# Patient Record
Sex: FEMALE | Race: WHITE | NOT HISPANIC OR LATINO | Employment: FULL TIME | URBAN - METROPOLITAN AREA
[De-identification: names, ages, dates, MRNs, and addresses within clinical notes are randomized per-mention and may not be internally consistent; named-entity substitution may affect disease eponyms.]

---

## 2018-09-06 LAB — HBA1C MFR BLD HPLC: 7.2 %

## 2018-09-07 LAB — HBA1C MFR BLD HPLC: 7.2 %

## 2018-09-14 ENCOUNTER — OFFICE VISIT (OUTPATIENT)
Dept: INTERNAL MEDICINE CLINIC | Facility: CLINIC | Age: 58
End: 2018-09-14
Payer: OTHER MISCELLANEOUS

## 2018-09-14 VITALS
RESPIRATION RATE: 18 BRPM | SYSTOLIC BLOOD PRESSURE: 128 MMHG | HEART RATE: 79 BPM | TEMPERATURE: 97.3 F | DIASTOLIC BLOOD PRESSURE: 62 MMHG | OXYGEN SATURATION: 98 %

## 2018-09-14 DIAGNOSIS — F07.81 POST CONCUSSION SYNDROME: ICD-10-CM

## 2018-09-14 DIAGNOSIS — R55 SYNCOPE, UNSPECIFIED SYNCOPE TYPE: Primary | ICD-10-CM

## 2018-09-14 DIAGNOSIS — M50.30 DDD (DEGENERATIVE DISC DISEASE), CERVICAL: ICD-10-CM

## 2018-09-14 DIAGNOSIS — R20.2 TINGLING OF FACE: ICD-10-CM

## 2018-09-14 DIAGNOSIS — M26.622 TMJ TENDERNESS, LEFT: ICD-10-CM

## 2018-09-14 PROCEDURE — 99204 OFFICE O/P NEW MOD 45 MIN: CPT | Performed by: INTERNAL MEDICINE

## 2018-09-14 RX ORDER — CRANBERRY FRUIT EXTRACT 200 MG
CAPSULE ORAL
COMMUNITY
Start: 2014-05-01

## 2018-09-14 RX ORDER — CHLORAL HYDRATE 500 MG
CAPSULE ORAL
COMMUNITY

## 2018-09-14 RX ORDER — OMEPRAZOLE 40 MG/1
40 CAPSULE, DELAYED RELEASE ORAL 2 TIMES DAILY
Refills: 5 | COMMUNITY
Start: 2018-08-23

## 2018-09-14 RX ORDER — LISINOPRIL AND HYDROCHLOROTHIAZIDE 20; 12.5 MG/1; MG/1
1 TABLET ORAL EVERY 12 HOURS
Refills: 5 | COMMUNITY
Start: 2018-08-28

## 2018-09-14 RX ORDER — LOPERAMIDE HYDROCHLORIDE 2 MG/1
TABLET ORAL
COMMUNITY

## 2018-09-14 RX ORDER — ONDANSETRON HYDROCHLORIDE 8 MG/1
TABLET, FILM COATED ORAL
Refills: 5 | COMMUNITY
Start: 2018-08-08

## 2018-09-14 RX ORDER — DICYCLOMINE HCL 20 MG
TABLET ORAL
Refills: 5 | COMMUNITY
Start: 2018-08-16

## 2018-09-14 RX ORDER — GABAPENTIN 300 MG/1
300 CAPSULE ORAL DAILY
COMMUNITY

## 2018-09-14 RX ORDER — ACETAMINOPHEN 325 MG/1
TABLET ORAL
COMMUNITY

## 2018-09-14 RX ORDER — LINAGLIPTIN 5 MG/1
5 TABLET, FILM COATED ORAL DAILY
Refills: 5 | COMMUNITY
Start: 2018-08-23

## 2018-09-14 RX ORDER — ROSUVASTATIN CALCIUM 5 MG/1
TABLET, COATED ORAL
Refills: 0 | COMMUNITY
Start: 2018-09-07

## 2018-09-14 RX ORDER — MULTIVIT WITH MINERALS/LUTEIN
TABLET ORAL
COMMUNITY
Start: 2014-05-01

## 2018-09-14 RX ORDER — TRAMADOL HYDROCHLORIDE 50 MG/1
TABLET ORAL
Refills: 3 | COMMUNITY
Start: 2018-08-28

## 2018-09-14 RX ORDER — MONTELUKAST SODIUM 4 MG/1
TABLET, CHEWABLE ORAL
COMMUNITY
Start: 2014-03-17

## 2018-09-14 RX ORDER — PROCHLORPERAZINE MALEATE 10 MG
TABLET ORAL
COMMUNITY
End: 2018-10-30

## 2018-09-14 RX ORDER — OXYCODONE HYDROCHLORIDE AND ACETAMINOPHEN 5; 325 MG/1; MG/1
TABLET ORAL
COMMUNITY
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
## 2018-09-14 NOTE — LETTER
To whom it may concern,    The above pt was seen for a work related injury  Unfortunately she has a complex coarse and all of her records are unable at this time  Some of the hospital records and all of her recent OP appt notes with Cards, Neuro, and her PCP are not available  Pt reports already being back to work several days and is having no issues, but continues with left facial signs and symptoms  From a Musculoskeletal stand point and her current job description, she appears to be able to continue her job  From a cardiac and neurological stand point, the w/u still appears to be in discovery and I can not clear her from this stand point  She will best be cleared from this aspect of her care by her respective specialist  Please call with any questions  Thank you      Sincerely,      YANETH Self, DO

## 2018-10-30 ENCOUNTER — APPOINTMENT (OUTPATIENT)
Dept: RADIOLOGY | Facility: CLINIC | Age: 58
End: 2018-10-30
Payer: OTHER MISCELLANEOUS

## 2018-10-30 ENCOUNTER — TRANSCRIBE ORDERS (OUTPATIENT)
Dept: RADIOLOGY | Facility: CLINIC | Age: 58
End: 2018-10-30

## 2018-10-30 ENCOUNTER — OFFICE VISIT (OUTPATIENT)
Dept: INTERNAL MEDICINE CLINIC | Facility: CLINIC | Age: 58
End: 2018-10-30
Payer: OTHER MISCELLANEOUS

## 2018-10-30 VITALS
WEIGHT: 206 LBS | HEART RATE: 90 BPM | RESPIRATION RATE: 18 BRPM | BODY MASS INDEX: 36.5 KG/M2 | TEMPERATURE: 97.8 F | HEIGHT: 63 IN | DIASTOLIC BLOOD PRESSURE: 68 MMHG | SYSTOLIC BLOOD PRESSURE: 130 MMHG
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
## 2018-10-30 DIAGNOSIS — T07.XXXA MULTIPLE CONTUSIONS: ICD-10-CM

## 2018-10-30 DIAGNOSIS — G44.319 ACUTE POST-TRAUMATIC HEADACHE, NOT INTRACTABLE: ICD-10-CM

## 2018-10-30 DIAGNOSIS — S09.93XA FACIAL INJURY, INITIAL ENCOUNTER: ICD-10-CM
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
## 2018-10-30 DIAGNOSIS — M54.2 CERVICALGIA: ICD-10-CM

## 2018-10-30 DIAGNOSIS — M54.6 THORACIC SPINE PAIN: ICD-10-CM

## 2018-10-30 PROBLEM — A04.72 CLOSTRIDIUM DIFFICILE DIARRHEA: Status: ACTIVE | Noted: 2018-04-23

## 2018-10-30 PROBLEM — H26.9 CATARACTS, BOTH EYES: Status: ACTIVE | Noted: 2018-10-30

## 2018-10-30 PROCEDURE — 72040 X-RAY EXAM NECK SPINE 2-3 VW: CPT

## 2018-10-30 PROCEDURE — 70150 X-RAY EXAM OF FACIAL BONES: CPT

## 2018-10-30 PROCEDURE — 70260 X-RAY EXAM OF SKULL: CPT

## 2018-10-30 PROCEDURE — 99214 OFFICE O/P EST MOD 30 MIN: CPT | Performed by: INTERNAL MEDICINE

## 2018-10-30 NOTE — LETTER
To whom it may concern,  The above was seen for a work related injury and is in the process of being evaluated  She is off work until re-evaled in one week and is to start PT  Expect RTW in one week if continues to progress  Call with any questions       Sincerely,     YANETH Self, DO
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
## 2018-10-31 ENCOUNTER — TELEPHONE (OUTPATIENT)
Dept: INTERNAL MEDICINE CLINIC | Facility: CLINIC | Age: 58
End: 2018-10-31

## 2018-10-31 NOTE — TELEPHONE ENCOUNTER
Pt said she needs a note for work that says  she will be off until her appt with you on 11-7-18  It needs to be faxed [phone] [facility]    Is this ok to send?

## 2018-10-31 NOTE — TELEPHONE ENCOUNTER
I faxed the letter along with a cover sheet stating that she will be off until her next appt 11-7-18  Carin Oliveros

## 2018-11-07 ENCOUNTER — OFFICE VISIT (OUTPATIENT)
Dept: INTERNAL MEDICINE CLINIC | Facility: CLINIC | Age: 58
End: 2018-11-07
Payer: OTHER MISCELLANEOUS

## 2018-11-07 VITALS
RESPIRATION RATE: 18 BRPM | BODY MASS INDEX: 37.56 KG/M2 | HEIGHT: 63 IN | SYSTOLIC BLOOD PRESSURE: 132 MMHG | WEIGHT: 212 LBS | HEART RATE: 86 BPM | TEMPERATURE: 97.8 F | DIASTOLIC BLOOD PRESSURE: 64 MMHG

## 2018-11-07 DIAGNOSIS — T07.XXXA MULTIPLE CONTUSIONS: ICD-10-CM

## 2018-11-07 DIAGNOSIS — M54.2 CERVICALGIA: ICD-10-CM

## 2018-11-07 DIAGNOSIS — M54.6 THORACIC SPINE PAIN: ICD-10-CM

## 2018-11-07 DIAGNOSIS — S09.93XD FACIAL INJURY, SUBSEQUENT ENCOUNTER: ICD-10-CM
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
## 2018-11-07 DIAGNOSIS — G44.319 ACUTE POST-TRAUMATIC HEADACHE, NOT INTRACTABLE: ICD-10-CM

## 2018-11-07 PROCEDURE — 99213 OFFICE O/P EST LOW 20 MIN: CPT | Performed by: INTERNAL MEDICINE
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
## 2018-11-26 ENCOUNTER — TELEPHONE (OUTPATIENT)
Dept: INTERNAL MEDICINE CLINIC | Facility: CLINIC | Age: 58
End: 2018-11-26

## 2018-11-26 DIAGNOSIS — M54.6 THORACIC SPINE PAIN: ICD-10-CM

## 2018-11-26 DIAGNOSIS — R20.2 TINGLING OF FACE: ICD-10-CM
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
## 2018-11-26 DIAGNOSIS — S09.93XD FACIAL INJURY, SUBSEQUENT ENCOUNTER: ICD-10-CM

## 2018-11-26 DIAGNOSIS — M26.622 TMJ TENDERNESS, LEFT: ICD-10-CM
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
## 2018-11-26 DIAGNOSIS — M54.2 CERVICALGIA: ICD-10-CM
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
## 2018-11-27 NOTE — TELEPHONE ENCOUNTER
I TRIED CALLING TWO TIMES-NO ANSWER-NOT ABLE TO LEAVE MESSAGE  I NEED TO KNOW WHERE SHE WANTS TO GO FOR SPORTS MEDICINE AND PHYSICAL THERAPY   (DR HULL IS HERE IN [place] [phone])

## 2018-11-27 NOTE — TELEPHONE ENCOUNTER
TRIED TO CALL AGAIN-NO ANSWER-NOT ABLE TO LM  I SENT BOTH REFERRALS OUT IN THE MAIL TO THE PATIENT W A NOTE-SHE NEEDS TO SCHEDULE THESE APPTS ASAP-I INCLUEDED DR HERCLES CARD FOR HER   Shantelle Ingram

## 2018-12-05 DIAGNOSIS — S09.93XA FACIAL INJURY, INITIAL ENCOUNTER: Primary | ICD-10-CM

## 2018-12-10 ENCOUNTER — OFFICE VISIT (OUTPATIENT)
Dept: INTERNAL MEDICINE CLINIC | Facility: CLINIC | Age: 58
End: 2018-12-10
Payer: OTHER MISCELLANEOUS

## 2018-12-10 VITALS
WEIGHT: 210 LBS | RESPIRATION RATE: 18 BRPM | HEART RATE: 90 BPM | BODY MASS INDEX: 37.21 KG/M2 | OXYGEN SATURATION: 98 % | HEIGHT: 63 IN | DIASTOLIC BLOOD PRESSURE: 62 MMHG | TEMPERATURE: 97.6 F | SYSTOLIC BLOOD PRESSURE: 128 MMHG

## 2018-12-10 DIAGNOSIS — M54.6 THORACIC SPINE PAIN: ICD-10-CM

## 2018-12-10 DIAGNOSIS — S09.93XD FACIAL INJURY, SUBSEQUENT ENCOUNTER: ICD-10-CM
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
## 2018-12-10 DIAGNOSIS — G50.0 TRIGEMINAL NEURALGIA: ICD-10-CM
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
## 2018-12-10 DIAGNOSIS — M26.622 TMJ TENDERNESS, LEFT: ICD-10-CM

## 2018-12-10 DIAGNOSIS — R20.2 TINGLING OF FACE: ICD-10-CM

## 2018-12-10 DIAGNOSIS — M54.2 CERVICALGIA: ICD-10-CM

## 2018-12-10 PROCEDURE — 99213 OFFICE O/P EST LOW 20 MIN: CPT | Performed by: INTERNAL MEDICINE

## 2018-12-10 RX ORDER — PROCHLORPERAZINE MALEATE 5 MG/1
TABLET ORAL
Refills: 3 | COMMUNITY
Start: 2018-12-06 | End: 2018-12-10
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
## 2018-12-17 ENCOUNTER — TELEPHONE (OUTPATIENT)
Dept: INTERNAL MEDICINE CLINIC | Facility: CLINIC | Age: 58
End: 2018-12-17
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
## 2018-12-26 ENCOUNTER — TELEPHONE (OUTPATIENT)
Dept: INTERNAL MEDICINE CLINIC | Facility: CLINIC | Age: 58
End: 2018-12-26

## 2018-12-26 NOTE — TELEPHONE ENCOUNTER
Pt called and reschedule the appointment, but had a question about getting medical alexandr and what the Doctors name is  ?    Please advise    Phone: [phone]
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
## 2022-01-10 ENCOUNTER — HOSPITAL ENCOUNTER (EMERGENCY)
Facility: HOSPITAL | Age: 62
Discharge: HOME | End: 2022-01-11
Attending: EMERGENCY MEDICINE | Admitting: EMERGENCY MEDICINE
Payer: COMMERCIAL

## 2022-01-10 ENCOUNTER — APPOINTMENT (EMERGENCY)
Dept: CT IMAGING | Facility: HOSPITAL | Age: 62
End: 2022-01-10
Payer: COMMERCIAL

## 2022-01-10 DIAGNOSIS — I10 HYPERTENSION: ICD-10-CM

## 2022-01-10 DIAGNOSIS — R11.2 NAUSEA & VOMITING: Primary | ICD-10-CM

## 2022-01-10 DIAGNOSIS — R51.9 HEADACHE: ICD-10-CM

## 2022-01-10 LAB
ALBUMIN SERPL BCP-MCNC: 3.6 G/DL (ref 3.5–5)
ALP SERPL-CCNC: 117 U/L (ref 46–116)
ALT SERPL W P-5'-P-CCNC: 24 U/L (ref 12–78)
ANION GAP SERPL CALCULATED.3IONS-SCNC: 9 MMOL/L (ref 4–13)
AST SERPL W P-5'-P-CCNC: 14 U/L (ref 5–45)
BASOPHILS # BLD AUTO: 0.07 THOUSANDS/ΜL (ref 0–0.1)
BASOPHILS NFR BLD AUTO: 0 % (ref 0–1)
BILIRUB SERPL-MCNC: 0.4 MG/DL (ref 0.2–1)
BUN SERPL-MCNC: 14 MG/DL (ref 5–25)
CALCIUM SERPL-MCNC: 9.1 MG/DL (ref 8.3–10.1)
CHLORIDE SERPL-SCNC: 102 MMOL/L (ref 100–108)
CO2 SERPL-SCNC: 27 MMOL/L (ref 21–32)
CREAT SERPL-MCNC: 0.86 MG/DL (ref 0.6–1.3)
EOSINOPHIL # BLD AUTO: 0.11 THOUSAND/ΜL (ref 0–0.61)
EOSINOPHIL NFR BLD AUTO: 1 % (ref 0–6)
ERYTHROCYTE [DISTWIDTH] IN BLOOD BY AUTOMATED COUNT: 13.6 % (ref 11.6–15.1)
GFR SERPL CREATININE-BSD FRML MDRD: 72 ML/MIN/1.73SQ M
GLUCOSE SERPL-MCNC: 154 MG/DL (ref 65–140)
HCT VFR BLD AUTO: 39.1 % (ref 34.8–46.1)
HGB BLD-MCNC: 12.7 G/DL (ref 11.5–15.4)
HOLD SPECIMEN: NORMAL
IMM GRANULOCYTES # BLD AUTO: 0.14 THOUSAND/UL (ref 0–0.2)
IMM GRANULOCYTES NFR BLD AUTO: 1 % (ref 0–2)
LIPASE SERPL-CCNC: 61 U/L (ref 73–393)
LYMPHOCYTES # BLD AUTO: 2.14 THOUSANDS/ΜL (ref 0.6–4.47)
LYMPHOCYTES NFR BLD AUTO: 12 % (ref 14–44)
MCH RBC QN AUTO: 28.5 PG (ref 26.8–34.3)
MCHC RBC AUTO-ENTMCNC: 32.5 G/DL (ref 31.4–37.4)
MCV RBC AUTO: 88 FL (ref 82–98)
MONOCYTES # BLD AUTO: 0.72 THOUSAND/ΜL (ref 0.17–1.22)
MONOCYTES NFR BLD AUTO: 4 % (ref 4–12)
NEUTROPHILS # BLD AUTO: 15.12 THOUSANDS/ΜL (ref 1.85–7.62)
NEUTS SEG NFR BLD AUTO: 82 % (ref 43–75)
NRBC BLD AUTO-RTO: 0 /100 WBCS
PLATELET # BLD AUTO: 310 THOUSANDS/UL (ref 149–390)
PMV BLD AUTO: 12.2 FL (ref 8.9–12.7)
POTASSIUM SERPL-SCNC: 3.7 MMOL/L (ref 3.5–5.3)
PROT SERPL-MCNC: 8.1 G/DL (ref 6.4–8.2)
RBC # BLD AUTO: 4.45 MILLION/UL (ref 3.81–5.12)
SODIUM SERPL-SCNC: 138 MMOL/L (ref 136–145)
WBC # BLD AUTO: 18.3 THOUSAND/UL (ref 4.31–10.16)

## 2022-01-10 PROCEDURE — 36415 COLL VENOUS BLD VENIPUNCTURE: CPT

## 2022-01-10 PROCEDURE — 96375 TX/PRO/DX INJ NEW DRUG ADDON: CPT

## 2022-01-10 PROCEDURE — 96374 THER/PROPH/DIAG INJ IV PUSH: CPT

## 2022-01-10 PROCEDURE — 80053 COMPREHEN METABOLIC PANEL: CPT | Performed by: EMERGENCY MEDICINE

## 2022-01-10 PROCEDURE — 85025 COMPLETE CBC W/AUTO DIFF WBC: CPT | Performed by: EMERGENCY MEDICINE

## 2022-01-10 PROCEDURE — 99284 EMERGENCY DEPT VISIT MOD MDM: CPT

## 2022-01-10 PROCEDURE — 83690 ASSAY OF LIPASE: CPT | Performed by: EMERGENCY MEDICINE

## 2022-01-10 PROCEDURE — 96361 HYDRATE IV INFUSION ADD-ON: CPT

## 2022-01-10 PROCEDURE — 99285 EMERGENCY DEPT VISIT HI MDM: CPT | Performed by: EMERGENCY MEDICINE

## 2022-01-10 PROCEDURE — 93005 ELECTROCARDIOGRAM TRACING: CPT

## 2022-01-10 RX ORDER — MORPHINE SULFATE 10 MG/ML
6 INJECTION, SOLUTION INTRAMUSCULAR; INTRAVENOUS ONCE
Status: COMPLETED | OUTPATIENT
Start: 2022-01-10 | End: 2022-01-10

## 2022-01-10 RX ORDER — ONDANSETRON 2 MG/ML
4 INJECTION INTRAMUSCULAR; INTRAVENOUS ONCE
Status: COMPLETED | OUTPATIENT
Start: 2022-01-10 | End: 2022-01-10

## 2022-01-10 RX ADMIN — SODIUM CHLORIDE 1000 ML: 0.9 INJECTION, SOLUTION INTRAVENOUS at 22:27

## 2022-01-10 RX ADMIN — MORPHINE SULFATE 6 MG: 10 INJECTION INTRAVENOUS at 22:27

## 2022-01-10 RX ADMIN — ONDANSETRON 4 MG: 2 INJECTION INTRAMUSCULAR; INTRAVENOUS at 21:15

## 2022-01-11 ENCOUNTER — APPOINTMENT (EMERGENCY)
Dept: CT IMAGING | Facility: HOSPITAL | Age: 62
End: 2022-01-11
Payer: COMMERCIAL

## 2022-01-11 VITALS
TEMPERATURE: 98.5 F | OXYGEN SATURATION: 94 % | HEART RATE: 60 BPM | RESPIRATION RATE: 18 BRPM | SYSTOLIC BLOOD PRESSURE: 189 MMHG | DIASTOLIC BLOOD PRESSURE: 76 MMHG

## 2022-01-11 PROCEDURE — 70450 CT HEAD/BRAIN W/O DYE: CPT

## 2022-01-11 PROCEDURE — G1004 CDSM NDSC: HCPCS

## 2022-01-11 PROCEDURE — 96376 TX/PRO/DX INJ SAME DRUG ADON: CPT

## 2022-01-11 PROCEDURE — 96372 THER/PROPH/DIAG INJ SC/IM: CPT

## 2022-01-11 PROCEDURE — 96375 TX/PRO/DX INJ NEW DRUG ADDON: CPT

## 2022-01-11 PROCEDURE — 74177 CT ABD & PELVIS W/CONTRAST: CPT

## 2022-01-11 PROCEDURE — 96361 HYDRATE IV INFUSION ADD-ON: CPT

## 2022-01-11 RX ORDER — ONDANSETRON 4 MG/1
4 TABLET, ORALLY DISINTEGRATING ORAL EVERY 6 HOURS PRN
Qty: 20 TABLET | Refills: 0 | Status: SHIPPED | OUTPATIENT
Start: 2022-01-11

## 2022-01-11 RX ORDER — HALOPERIDOL 5 MG/ML
5 INJECTION INTRAMUSCULAR ONCE
Status: COMPLETED | OUTPATIENT
Start: 2022-01-11 | End: 2022-01-11

## 2022-01-11 RX ORDER — ONDANSETRON 2 MG/ML
4 INJECTION INTRAMUSCULAR; INTRAVENOUS ONCE
Status: COMPLETED | OUTPATIENT
Start: 2022-01-11 | End: 2022-01-11

## 2022-01-11 RX ORDER — ONDANSETRON 2 MG/ML
INJECTION INTRAMUSCULAR; INTRAVENOUS
Status: DISCONTINUED
Start: 2022-01-11 | End: 2022-01-11 | Stop reason: WASHOUT

## 2022-01-11 RX ORDER — BUTALBITAL, ACETAMINOPHEN AND CAFFEINE 50; 325; 40 MG/1; MG/1; MG/1
1 TABLET ORAL ONCE
Status: COMPLETED | OUTPATIENT
Start: 2022-01-11 | End: 2022-01-11

## 2022-01-11 RX ORDER — KETOROLAC TROMETHAMINE 30 MG/ML
15 INJECTION, SOLUTION INTRAMUSCULAR; INTRAVENOUS ONCE
Status: COMPLETED | OUTPATIENT
Start: 2022-01-11 | End: 2022-01-11

## 2022-01-11 RX ORDER — ACETAMINOPHEN 325 MG/1
650 TABLET ORAL ONCE
Status: COMPLETED | OUTPATIENT
Start: 2022-01-11 | End: 2022-01-11

## 2022-01-11 RX ADMIN — KETOROLAC TROMETHAMINE 15 MG: 30 INJECTION, SOLUTION INTRAMUSCULAR; INTRAVENOUS at 05:33

## 2022-01-11 RX ADMIN — ACETAMINOPHEN 650 MG: 325 TABLET, FILM COATED ORAL at 05:33

## 2022-01-11 RX ADMIN — IOHEXOL 100 ML: 350 INJECTION, SOLUTION INTRAVENOUS at 00:14

## 2022-01-11 RX ADMIN — HALOPERIDOL LACTATE 5 MG: 5 INJECTION, SOLUTION INTRAMUSCULAR at 03:22

## 2022-01-11 RX ADMIN — ONDANSETRON HYDROCHLORIDE 4 MG: 2 INJECTION, SOLUTION INTRAVENOUS at 00:10

## 2022-01-11 RX ADMIN — BUTALBITAL, ACETAMINOPHEN AND CAFFEINE 1 TABLET: 50; 325; 40 TABLET ORAL at 05:33

## 2022-01-11 NOTE — ED PROVIDER NOTES
History  Chief Complaint   Patient presents with    Vomiting     patient c/o vomiting and nausea that she always has that got worse today  states she is "chronically ill" for a long time  This is a 69-year-old female with history of Crohn's disease on stelara, diabetes, GERD, hypertension who presents with abdominal pain  Patient states that she has chronic abdominal pain  However, starting today, she has been experiencing innumerable episodes of nonbloody, nonmelanotic diarrhea and nonbloody vomiting  Denies any sick contacts  Patient has also been experiencing 3 weeks of worsening headaches  She has been seen at [facility] for the headaches  Patient follows with KRISTIE Carr for many of her medical issues  She has also noted several weeks of elevated blood pressure  Patient states that her family doctor added Norvasc to her regimen  Denies fever/chills, lightheadedness/dizziness, numbness/weakness, headache, change in vision, URI symptoms, neck pain, chest pain, palpitations, shortness of breath, cough, back pain, flank pain, hematochezia, melena, dysuria, hematuria, abnormal vaginal discharge/bleeding  Prior to Admission Medications   Prescriptions Last Dose Informant Patient Reported? Taking?    Ascorbic Acid (VITAMIN C) 1000 MG tablet   Yes Yes   Sig: Take by mouth   Cholecalciferol 20606 units CAPS   Yes Yes   Sig: Take by mouth   Omega-3 Fatty Acids (FISH OIL) 1,000 mg   Yes Yes   Sig: Take by mouth   Probiotic Product (ACIDOPHILUS PROBIOTIC BLEND) CAPS   Yes Yes   Sig: Take by mouth   TRADJENTA 5 MG TABS   Yes Yes   Sig: Take 5 mg by mouth daily   TRULICITY 1 49 QD/3 6DG SOPN   Yes Yes   Sig: INJECT 0 75MG/0 5ML (1 SYRINGE) SUBCUTANEOUSLY WEEKLY   acetaminophen (TYLENOL) 325 mg tablet   Yes Yes   Sig: Take by mouth   colestipol (COLESTID) 1 g tablet   Yes Yes   Sig: Take by mouth   conjugated estrogens (PREMARIN) vaginal cream   Yes Yes   Sig: Apply 0 5 grams to vagina 3 x per week   dicyclomine (BENTYL) 20 mg tablet   Yes Yes   Sig: TAKE (1) TABLET EVERY SIX HOURS    gabapentin (NEURONTIN) 300 mg capsule   Yes Yes   Sig: Take 300 mg by mouth daily   lisinopril-hydrochlorothiazide (PRINZIDE,ZESTORETIC) 20-12 5 MG per tablet   Yes Yes   Sig: Take 1 tablet by mouth every 12 (twelve) hours   loperamide (IMODIUM A-D) 2 MG tablet   Yes Yes   Sig: Take by mouth   omeprazole (PriLOSEC) 40 MG capsule   Yes Yes   Sig: Take 40 mg by mouth 2 (two) times a day   ondansetron (ZOFRAN) 8 mg tablet   Yes Yes   Sig: DISSOLVE 1 TABLET IN MOUTH EVERY 8 HOURS AS NEEDED  oxyCODONE-acetaminophen (PERCOCET) 5-325 mg per tablet   Yes Yes   Sig: Take by mouth   prochlorperazine (COMPAZINE) 5 mg tablet   Yes Yes   Sig: TAKE ONE TABLET BY MOUTH 4 TIMES A DAY AS NEEDED FOR NAUSEA   rosuvastatin (CRESTOR) 5 mg tablet   Yes Yes   Sig: TAKE ONE TABLET BY MOUTH ON MONDAYS, WEDNESDAY AND FRIDAYS   traMADol (ULTRAM) 50 mg tablet   Yes Yes   Sig: TAKE ONE TABLET BY MOUTH 4 TIMES A DAY AS NEEDED   vedolizumab (ENTYVIO) 300 MG SOLR   Yes Yes   Sig: Infuse into a venous catheter      Facility-Administered Medications: None       Past Medical History:   Diagnosis Date    [facility]     [facility] ([facility] )     GERD (gastroesophageal reflux disease)     Hypertension        Past Surgical History:   Procedure Laterality Date    APPENDECTOMY      COLON SURGERY         Family History   Problem Relation Age of Onset    Heart disease Mother      I have reviewed and agree with the history as documented  E-Cigarette/Vaping     E-Cigarette/Vaping Substances     Social History     Tobacco Use    Smoking status: Current Every Day Smoker    Smokeless tobacco: Never Used   Substance Use Topics    Alcohol use: No    Drug use: No       Review of Systems   Constitutional: Negative for chills and fever  HENT: Negative for rhinorrhea, sore throat and trouble swallowing      Eyes: Negative for photophobia and visual disturbance  Respiratory: Negative for cough, chest tightness and shortness of breath  Cardiovascular: Negative for chest pain, palpitations and leg swelling  Gastrointestinal: Positive for abdominal pain, diarrhea, nausea and vomiting  Negative for blood in stool  Endocrine: Negative for polyuria  Genitourinary: Negative for dysuria, flank pain, hematuria, vaginal bleeding and vaginal discharge  Musculoskeletal: Negative for back pain and neck pain  Skin: Negative for color change and rash  Allergic/Immunologic: Negative for immunocompromised state  Neurological: Positive for headaches  Negative for dizziness, weakness, light-headedness and numbness  All other systems reviewed and are negative  Physical Exam  Physical Exam  Constitutional:       General: She is not in acute distress  Appearance: Normal appearance  She is well-developed  Comments: Patient appears uncomfortable  HENT:      Mouth/Throat:      Pharynx: Uvula midline  Eyes:      Conjunctiva/sclera: Conjunctivae normal       Pupils: Pupils are equal, round, and reactive to light  Neck:      Thyroid: No thyroid mass or thyromegaly  Trachea: Trachea normal    Cardiovascular:      Rate and Rhythm: Normal rate and regular rhythm  Pulses: Normal pulses  Heart sounds: Normal heart sounds  No murmur heard  Pulmonary:      Effort: Pulmonary effort is normal       Breath sounds: Normal breath sounds  Abdominal:      General: Bowel sounds are normal       Palpations: Abdomen is soft  Tenderness: There is generalized abdominal tenderness  There is no guarding or rebound  Comments: Generalized abdominal tenderness, but mostly tender on the right side  Skin:     General: Skin is warm and dry  Neurological:      Mental Status: She is alert  Psychiatric:         Speech: Speech normal          Behavior: Behavior normal  Behavior is cooperative  Thought Content:  Thought content normal  Vital Signs  ED Triage Vitals   Temperature Pulse Respirations Blood Pressure SpO2   01/10/22 2046 01/10/22 2046 01/10/22 2046 01/10/22 2048 01/10/22 2046   98 5 °F (36 9 °C) 61 20 (S) (!) 217/119 97 %      Temp Source Heart Rate Source Patient Position - Orthostatic VS BP Location FiO2 (%)   01/10/22 2046 01/10/22 2046 01/10/22 2046 01/10/22 2046 --   Tympanic Monitor Sitting Left arm       Pain Score       01/10/22 2046       10 - Worst Possible Pain           Vitals:    01/10/22 2046 01/10/22 2048 01/11/22 0333   BP:  (S) (!) 217/119 (!) 189/76   Pulse: 61  60   Patient Position - Orthostatic VS: Sitting           Visual Acuity  Visual Acuity      Most Recent Value   L Pupil Size (mm) 2   R Pupil Size (mm) 2          ED Medications  Medications   ondansetron (ZOFRAN) injection 4 mg (4 mg Intravenous Given 1/10/22 2115)   sodium chloride 0 9 % bolus 1,000 mL (1,000 mL Intravenous New Bag 1/10/22 2227)   morphine (PF) 10 mg/mL injection 6 mg (6 mg Intravenous Given 1/10/22 2227)   ondansetron (ZOFRAN) injection 4 mg (4 mg Intravenous Given 1/11/22 0010)   iohexol (OMNIPAQUE) 350 MG/ML injection (SINGLE-DOSE) 100 mL (100 mL Intravenous Given 1/11/22 0014)   haloperidol lactate (HALDOL) injection 5 mg (5 mg Intramuscular Given 1/11/22 0322)   ketorolac (TORADOL) injection 15 mg (15 mg Intravenous Given 1/11/22 0533)   butalbital-acetaminophen-caffeine (FIORICET,ESGIC) -40 mg per tablet 1 tablet (1 tablet Oral Given 1/11/22 0533)   acetaminophen (TYLENOL) tablet 650 mg (650 mg Oral Given 1/11/22 0533)       Diagnostic Studies  Results Reviewed     Procedure Component Value Units Date/Time    [place] draw [[id number]] Collected: 01/10/22 2115    Lab Status: Final result Specimen: Blood from Arm, Left Updated: 01/10/22 2301    Narrative: The following orders were created for panel order [place] draw    Procedure                               Abnormality         Status                     --------- -----------         ------                     [place] Seal Top on ATLL[[id number]]                           Final result               Gold top on FBVJ[[id number]]                                 Final result               Green / Black tube on XKRA[[id number]]                       Final result                 Please view results for these tests on the individual orders      UA w Reflex to Microscopic w Reflex to Culture [[id number]]     Lab Status: No result Specimen: Urine     Stool Enteric Bacterial Panel by PCR [[id number]]     Lab Status: No result Specimen: Stool     Clostridium difficile toxin by PCR with EIA [[id number]]     Lab Status: No result Specimen: Stool     Comprehensive metabolic panel [[id number]]  (Abnormal) Collected: 01/10/22 2115    Lab Status: Final result Specimen: Blood from Arm, Left Updated: 01/10/22 2139     Sodium 138 mmol/L      Potassium 3 7 mmol/L      Chloride 102 mmol/L      CO2 27 mmol/L      ANION GAP 9 mmol/L      BUN 14 mg/dL      Creatinine 0 86 mg/dL      Glucose 154 mg/dL      Calcium 9 1 mg/dL      AST 14 U/L      ALT 24 U/L      Alkaline Phosphatase 117 U/L      Total Protein 8 1 g/dL      Albumin 3 6 g/dL      Total Bilirubin 0 40 mg/dL      eGFR 72 ml/min/1 73sq m     Narrative:      [place] guidelines for Chronic Kidney Disease (CKD):     Stage 1 with normal or high GFR (GFR > 90 mL/min/1 73 square meters)    Stage 2 Mild CKD (GFR = 60-89 mL/min/1 73 square meters)    Stage 3A Moderate CKD (GFR = 45-59 mL/min/1 73 square meters)    Stage 3B Moderate CKD (GFR = 30-44 mL/min/1 73 square meters)    Stage 4 Severe CKD (GFR = 15-29 mL/min/1 73 square meters)    Stage 5 End Stage CKD (GFR <15 mL/min/1 73 square meters)  Note: GFR calculation is accurate only with a steady state creatinine    Lipase [[id number]]  (Abnormal) Collected: 01/10/22 2115    Lab Status: Final result Specimen: Blood from Arm, Left Updated: 01/10/22 2134 Lipase 61 u/L     CBC and differential [[id number]]  (Abnormal) Collected: 01/10/22 2115    Lab Status: Final result Specimen: Blood from Arm, Left Updated: 01/10/22 2126     WBC 18 30 Thousand/uL      RBC 4 45 Million/uL      Hemoglobin 12 7 g/dL      Hematocrit 39 1 %      MCV 88 fL      MCH 28 5 pg      MCHC 32 5 g/dL      RDW 13 6 %      MPV 12 2 fL      Platelets 076 Thousands/uL      nRBC 0 /100 WBCs      Neutrophils Relative 82 %      Immat GRANS % 1 %      Lymphocytes Relative 12 %      Monocytes Relative 4 %      Eosinophils Relative 1 %      Basophils Relative 0 %      Neutrophils Absolute 15 12 Thousands/µL      Immature Grans Absolute 0 14 Thousand/uL      Lymphocytes Absolute 2 14 Thousands/µL      Monocytes Absolute 0 72 Thousand/µL      Eosinophils Absolute 0 11 Thousand/µL      Basophils Absolute 0 07 Thousands/µL                  CT head without contrast   Final Result by Juliocesar Diaz MD (01/11 9031)      No acute intracranial abnormality  Workstation performed: [id number]         CT abdomen pelvis with contrast   Final Result by Sunday Wells MD (01/11 0113)      No acute intra-abdominal abnormality  No free air or free fluid  No evidence of large or small bowel obstruction  3 cm left adrenal soft tissue nodule  A nonemergent dedicated enhanced CT or MRI of the adrenal glands is recommended for further characterization  Postoperative changes of prior partial colectomy  No inflammatory stranding noted to suggest active Crohn's    The study was limited by the lack of oral contrast             Workstation performed: [id number]                    Procedures  ECG 12 Lead Documentation Only    Date/Time: 1/10/2022 10:39 PM  Performed by: Skip Bustillo MD  Authorized by: Skip Bustillo MD     ECG reviewed by me, the ED Provider: yes    Patient location:  ED  Previous ECG:     Previous ECG:  Unavailable    Comparison to cardiac monitor: Yes Interpretation:     Interpretation: non-specific    Rate:     ECG rate:  53    ECG rate assessment: bradycardic    Rhythm:     Rhythm: sinus bradycardia    Ectopy:     Ectopy: none    QRS:     QRS axis:  Normal    QRS intervals:  Normal  Conduction:     Conduction: abnormal      Abnormal conduction: 1st degree    ST segments:     ST segments:  Normal  T waves:     T waves: normal               ED Course  ED Course as of 01/11/22 0617   Tue Jan 11, 2022   0607 Patient feeling much better  Given water for p o  Challenge  5197 Patient tolerating p o  Without difficulty  Will discharge  MDM  Number of Diagnoses or Management Options  Diagnosis management comments: Will check labs, EKG  Urinalysis  CT abdomen/pelvis with contrast   Morphine, Zofran, fluids  Disposition  Final diagnoses:   Nausea & vomiting   Hypertension   Headache     Time reflects when diagnosis was documented in both MDM as applicable and the Disposition within this note     Time User Action Codes Description Comment    1/11/2022  6:16 AM Gayleen Hickory P Add [R11 2] Nausea & vomiting     1/11/2022  6:16 AM Gayleen [place] P Add [I10] Hypertension     1/11/2022  6:16 AM Deloris Greener Add [R51 9] Headache       ED Disposition     ED Disposition Condition Date/Time Comment    Discharge Stable Tue Jan 11, 2022  6:16 AM Gary Acuna discharge to home/self care              Follow-up Information     Follow up With Specialties Details Why Contact Info Additional 49 Sabine Rivera MD Internal Medicine Schedule an appointment as soon as possible for a visit   [facility] [phone]       [facility] Emergency Department Emergency Medicine Go to  If symptoms worsen [facility] [phone]  [address] [place] [address] Emergency Department[address], [address]          Patient's Medications   Discharge Prescriptions    ONDANSETRON (ZOFRAN-ODT) 4 MG DISINTEGRATING TABLET    Take 1 tablet (4 mg total) by mouth every 6 (six) hours as needed for vomiting       Start Date: 1/11/2022 End Date: --       Order Dose: 4 mg       Quantity: 20 tablet    Refills: 0       No discharge procedures on file      PDMP Review     None          ED Provider  Electronically Signed by           Hershel Osler, MD  01/11/22 4449

## 2022-01-11 NOTE — ED NOTES
Patient reports that she took 8mg of zofran and 5mg of norvasc prior to arrival       Nuvia Baires RN  01/10/22 8523

## 2022-01-18 LAB
ATRIAL RATE: 53 BPM
P AXIS: 66 DEGREES
PR INTERVAL: 242 MS
QRS AXIS: 41 DEGREES
QRSD INTERVAL: 90 MS
QT INTERVAL: 450 MS
QTC INTERVAL: 422 MS
T WAVE AXIS: 46 DEGREES
VENTRICULAR RATE: 53 BPM

## 2022-01-18 PROCEDURE — 93010 ELECTROCARDIOGRAM REPORT: CPT | Performed by: INTERNAL MEDICINE

## 2023-04-23 ENCOUNTER — OFFICE VISIT (OUTPATIENT)
Dept: URGENT CARE | Facility: CLINIC | Age: 63
End: 2023-04-23

## 2023-04-23 VITALS
DIASTOLIC BLOOD PRESSURE: 72 MMHG | RESPIRATION RATE: 18 BRPM | HEART RATE: 80 BPM | TEMPERATURE: 97.5 F | SYSTOLIC BLOOD PRESSURE: 118 MMHG | OXYGEN SATURATION: 96 %

## 2023-04-23 DIAGNOSIS — Z87.19 HISTORY OF CROHN'S DISEASE: ICD-10-CM

## 2023-04-23 DIAGNOSIS — N39.0 URINARY TRACT INFECTION WITHOUT HEMATURIA, SITE UNSPECIFIED: ICD-10-CM

## 2023-04-23 DIAGNOSIS — R10.30 LOWER ABDOMINAL PAIN: ICD-10-CM

## 2023-04-23 DIAGNOSIS — R30.0 DYSURIA: Primary | ICD-10-CM

## 2023-04-23 LAB
SL AMB  POCT GLUCOSE, UA: NEGATIVE
SL AMB LEUKOCYTE ESTERASE,UA: ABNORMAL
SL AMB POCT BILIRUBIN,UA: NEGATIVE
SL AMB POCT BLOOD,UA: NEGATIVE
SL AMB POCT CLARITY,UA: CLEAR
SL AMB POCT COLOR,UA: YELLOW
SL AMB POCT KETONES,UA: NEGATIVE
SL AMB POCT NITRITE,UA: NEGATIVE
SL AMB POCT PH,UA: 5
SL AMB POCT SPECIFIC GRAVITY,UA: 1.02
SL AMB POCT URINE PROTEIN: 2000
SL AMB POCT UROBILINOGEN: 0.2

## 2023-04-23 RX ORDER — FLUCONAZOLE 200 MG/1
200 TABLET ORAL
COMMUNITY
Start: 2023-04-19

## 2023-04-23 RX ORDER — CLOTRIMAZOLE AND BETAMETHASONE DIPROPIONATE 10; .64 MG/G; MG/G
1 CREAM TOPICAL
COMMUNITY
Start: 2023-04-21

## 2023-04-23 RX ORDER — GLIMEPIRIDE 2 MG/1
2 TABLET ORAL DAILY
COMMUNITY
Start: 2023-04-19

## 2023-04-23 RX ORDER — CIPROFLOXACIN 500 MG/1
500 TABLET, FILM COATED ORAL EVERY 12 HOURS SCHEDULED
Qty: 14 TABLET | Refills: 0 | Status: SHIPPED | OUTPATIENT
Start: 2023-04-23 | End: 2023-04-30

## 2023-04-23 RX ORDER — AMLODIPINE BESYLATE 5 MG/1
5 TABLET ORAL DAILY
COMMUNITY
Start: 2023-04-04

## 2023-04-23 RX ORDER — METOPROLOL SUCCINATE 200 MG/1
200 TABLET, EXTENDED RELEASE ORAL 2 TIMES DAILY
COMMUNITY
Start: 2023-03-06

## 2023-04-23 RX ORDER — NORTRIPTYLINE HYDROCHLORIDE 25 MG/1
25 CAPSULE ORAL
COMMUNITY

## 2023-04-23 RX ORDER — ONDANSETRON 4 MG/1
4 TABLET, FILM COATED ORAL
COMMUNITY
Start: 2023-04-17

## 2023-04-23 RX ORDER — AMLODIPINE BESYLATE 5 MG/1
5 TABLET ORAL DAILY
COMMUNITY
Start: 2023-02-02

## 2023-04-23 RX ORDER — AMMONIUM LACTATE 12 G/100G
1 CREAM TOPICAL
COMMUNITY
Start: 2023-01-09 | End: 2024-01-09

## 2023-04-23 RX ORDER — SITAGLIPTIN 100 MG/1
100 TABLET, FILM COATED ORAL DAILY
COMMUNITY
Start: 2023-04-13

## 2023-04-23 RX ORDER — CIPROFLOXACIN 500 MG/1
500 TABLET, FILM COATED ORAL 2 TIMES DAILY
COMMUNITY
Start: 2023-01-17

## 2023-04-23 RX ORDER — METHENAMINE HIPPURATE 1000 MG/1
1 TABLET ORAL 2 TIMES DAILY
COMMUNITY
Start: 2023-04-19

## 2023-04-23 NOTE — PATIENT INSTRUCTIONS
Take prescribed medication as instructed  Tylenol for pain or fever  Make sure to stay well-hydrated and rest   Continue to follow-up with your GI specialist at [facility] medical   Call about recent abdominal pain  We will call in about 1 to 2 days with results of urine culture  If symptoms worsen, go to the ER  Follow up with PCP in 3-5 days  Proceed to  ER if symptoms worsen  Crohn Disease   WHAT YOU NEED TO KNOW:   Crohn disease is an inflammatory disease of the digestive system  Crohn disease causes the lining of your intestines to become inflamed  The lining of your mouth, esophagus, or stomach may also be affected by Crohn disease  DISCHARGE INSTRUCTIONS:   Call your local emergency number (911 in the [address] [place] [address],[address]) if:   You suddenly have trouble breathing  You have a fast heart rate, fast breathing, or are too dizzy to stand  Return to the emergency department if:   You vomit blood, or your vomit looks like coffee grounds  You have severe pain in your stomach  Call your doctor or gastroenterologist if:   You have tar-colored bowel movements or you see blood in your bowel movements  You have a fever or chills  The pain in your abdomen does not go away or gets worse after you take medicine  Your abdomen is swollen  You are losing weight without trying  You have questions or concerns about your condition or care  Medicines: You may need any of the following:  Medicines  may be used to decrease inflammation in your digestive tract  Antibiotics  treat or prevent an infection caused by bacteria  Antidiarrheal medicine  is given to decrease diarrhea  Steroids  may be given to decrease inflammation  Immunosuppressants  may be given to slow your immune system  Take your medicine as directed  Contact your healthcare provider if you think your medicine is not helping or if you have side effects  Tell your provider if you are allergic to any medicine   Keep a list of the medicines, vitamins, and herbs you take  Include the amounts, and when and why you take them  Bring the list or the pill bottles to follow-up visits  Carry your medicine list with you in case of an emergency  Nutrition:  Keep a record of everything you eat and drink  Include any symptoms the food or drink causes or makes worse  You may need to avoid certain foods  Dairy, alcohol, hot spices, and high-fiber foods are common examples of foods that may worsen your symptoms  Your healthcare provider may recommend that you take vitamins or minerals  Always ask your healthcare provider before you take vitamins or nutritional supplements  Do not smoke:  Nicotine and other chemicals in cigarettes and cigars can cause lung damage and increase your risk for Crohn disease  Ask your healthcare provider for information if you currently smoke and need help to quit  E-cigarettes or smokeless tobacco still contain nicotine  Talk to your healthcare provider before you use these products  Follow up with your doctor or gastroenterologist as directed:  Record the number of bowel movements you have each day and describe the color and form (liquid, soft, or hard)  Write down if you saw blood in your bowel movement  Bring the record with you when you see your healthcare provider  Write down your questions so you remember to ask them during your visits  © Copyright [employer] 2022 Information is for End User's use only and may not be sold, redistributed or otherwise used for commercial purposes  The above information is an  only  It is not intended as medical advice for individual conditions or treatments  Talk to your doctor, nurse or pharmacist before following any medical regimen to see if it is safe and effective for you  Urinary Tract Infection in Women   WHAT YOU NEED TO KNOW:   A urinary tract infection (UTI) is caused by bacteria that get inside your urinary tract   Your urinary tract includes your kidneys, ureters, bladder, and urethra  A UTI is more common in your lower urinary tract, which includes your bladder and urethra  DISCHARGE INSTRUCTIONS:   Return to the emergency department if:   You are urinating very little or not at all  You have a high fever with shaking chills  You have side or back pain that gets worse  Call your doctor if:   You have a fever  You do not feel better after 2 days of taking antibiotics  You have new symptoms, such as blood or pus in your urine  You are vomiting  You have questions or concerns about your condition or care  Medicines:   Antibiotics  treat a bacterial infection  If you have UTIs often (called recurrent UTIs), you may be given antibiotics to take regularly  You will be given directions for when and how to use antibiotics  The goal is to prevent UTIs but not cause antibiotic resistance by using antibiotics too often  Medicines  may be given to decrease pain and burning when you urinate  They will also help decrease the feeling that you need to urinate often  These medicines may make your urine orange or red  Take your medicine as directed  Contact your healthcare provider if you think your medicine is not helping or if you have side effects  Tell your provider if you are allergic to any medicine  Keep a list of the medicines, vitamins, and herbs you take  Include the amounts, and when and why you take them  Bring the list or the pill bottles to follow-up visits  Carry your medicine list with you in case of an emergency  Follow up with your doctor as directed:  Write down your questions so you remember to ask them during your visits  Prevent another UTI:   Empty your bladder often  Urinate and empty your bladder as soon as you feel the need  Do not hold your urine for long periods of time  Wipe from front to back after you urinate or have a bowel movement    This will help prevent germs from getting into your urinary tract through your urethra  Drink liquids as directed  Ask how much liquid to drink each day and which liquids are best for you  You may need to drink more liquids than usual to help flush out the bacteria  Do not drink alcohol, caffeine, or citrus juices  These can irritate your bladder and increase your symptoms  Your healthcare provider may recommend cranberry juice to help prevent a UTI  Urinate before and after you have sex  This can help flush out bacteria passed during sex  Do not douche or use feminine deodorants  These can change the chemical balance in your vagina  Change sanitary pads or tampons often  This will help prevent germs from getting into your urinary tract  Talk to your healthcare provider about your birth control method  You may need to change your method if it is increasing your risk for UTIs  Wear cotton underwear and clothes that are loose  Tight pants and nylon underwear can trap moisture and cause bacteria to grow  Vaginal estrogen may be recommended  This medicine helps prevent UTIs in women who have gone through menopause or are in rosette-menopause  Do pelvic muscle exercises often  Pelvic muscle exercises may help you start and stop urinating  Strong pelvic muscles may help you empty your bladder easier  Squeeze these muscles tightly for 5 seconds like you are trying to hold back urine  Then relax for 5 seconds  Gradually work up to squeezing for 10 seconds  Do 3 sets of 15 repetitions a day, or as directed  © Copyright [employer] 2022 Information is for End User's use only and may not be sold, redistributed or otherwise used for commercial purposes  The above information is an  only  It is not intended as medical advice for individual conditions or treatments  Talk to your doctor, nurse or pharmacist before following any medical regimen to see if it is safe and effective for you    Dysuria   WHAT YOU NEED TO KNOW:   Dysuria is difficulty urinating, or pain, burning, or discomfort with urination  Dysuria is usually a symptom of another problem  DISCHARGE INSTRUCTIONS:   Return to the emergency department if:   You have severe back, side, or abdominal pain  You have fever and shaking chills  You vomit several times in a row  Contact your healthcare provider if:   Your symptoms do not go away, even after treatment  You have questions or concerns about your condition or care  Medicines:   Medicines  may be given to help treat a bacterial infection or help decrease bladder spasms  Take your medicine as directed  Contact your healthcare provider if you think your medicine is not helping or if you have side effects  Tell your provider if you are allergic to any medicine  Keep a list of the medicines, vitamins, and herbs you take  Include the amounts, and when and why you take them  Bring the list or the pill bottles to follow-up visits  Carry your medicine list with you in case of an emergency  Follow up with your healthcare provider as directed: Your healthcare provider may also refer you to a urologist or nephrologist to have additional testing  Write down your questions so you remember to ask them during your visits  Manage your dysuria:   Drink more liquids  Liquids help flush out bacteria that may be causing an infection  Ask your healthcare provider how much liquid to drink each day and which liquids are best for you  Take sitz baths as directed  Fill a bathtub with 4 to 6 inches of warm water  You may also use a sitz bath pan that fits over a toilet  Sit in the sitz bath for 20 minutes  Do this 2 to 3 times a day, or as directed  The warm water can help decrease pain and swelling  © Copyright [employer] 2022 Information is for End User's use only and may not be sold, redistributed or otherwise used for commercial purposes  The above information is an  only   It is not intended as medical advice for individual conditions or treatments  Talk to your doctor, nurse or pharmacist before following any medical regimen to see if it is safe and effective for you

## 2023-04-23 NOTE — PROGRESS NOTES
[facility]        NAME: Scar Jaime is a 60 y o  female  : 1960    MRN: [id number]  DATE: 2023  TIME: 12:41 PM    Assessment and Plan   Dysuria [R30 0]  1  Dysuria  POCT urine dip    Urine culture    ciprofloxacin (CIPRO) 500 mg tablet      2  Urinary tract infection without hematuria, site unspecified  ciprofloxacin (CIPRO) 500 mg tablet      3  Lower abdominal pain        4  History of Crohn's disease          Patient had leukocytes present and large protein  Starting on Cipro, which she has taken in the past and has tolerated well  Highly encouraged patient to call GI specialist for follow-up visit and for advice on recent symptoms  Symptoms of the abdomen appear to be chronic and has not changed in intensity/characteristics  Instructed patient to go to the ER if symptoms worsen or do not improve  Advised to follow-up with her family doctor  Informed patient I sent her urine for urine culture and will call about the results/need for change in antibiotic  Patient Instructions     Take prescribed medication as instructed  Tylenol for pain or fever  Make sure to stay well-hydrated and rest   Continue to follow-up with your GI specialist at [facility]   Call about recent abdominal pain  We will call in about 1 to 2 days with results of urine culture  If symptoms worsen, go to the ER  Follow up with PCP in 3-5 days  Proceed to  ER if symptoms worsen  Chief Complaint     Chief Complaint   Patient presents with   • Possible UTI     Reports feeling swollen lower abd, had CT scan 3/26 active colitis  Reports burning while urinating, reports seeing blood after wiping  Denies recent fever  Reports cloudy urine  Prone to UTIs, follows with urology and OBGYN  Has been taking tylenol otc  History of Present Illness       71-year-old female presents with burning with urination that started yesterday    PT also complaining of lower abdominal pain, chronic history of Crohn's and sees [facility] in [place] for specialty follow-up  PT states that her pain is similar to how it is in the past   This has been a chronic issue for many years  PT was seen in the ER on 3/26 and had a CT of the abdomen that showed history of Crohn's and colectomy, mesenteric adenopathy, fluid-filled loops consistent with diarrhea, and no obstruction/abscess  PT has a colonoscopy scheduled for 5/2/2023  Denies any different changes in her bowel habits compared to usual   Denies any fever, chills, chest pain, shortness of breath, low back pain  Denies blood in stool  Review of Systems   Review of Systems   Constitutional: Negative  HENT: Negative  Eyes: Negative  Cardiovascular: Negative  Gastrointestinal: Positive for abdominal pain  Negative for anal bleeding and blood in stool  Genitourinary: Positive for dysuria  Negative for flank pain and hematuria  Musculoskeletal: Negative  Neurological: Negative  Current Medications       Current Outpatient Medications:   •  acetaminophen (TYLENOL) 325 mg tablet, Take by mouth, Disp: , Rfl:   •  amLODIPine (NORVASC) 5 mg tablet, Take 5 mg by mouth daily, Disp: , Rfl:   •  amLODIPine (NORVASC) 5 mg tablet, Take 5 mg by mouth daily, Disp: , Rfl:   •  ammonium lactate (LAC-HYDRIN) 12 % cream, Apply 1 application  topically, Disp: , Rfl:   •  Ascorbic Acid (VITAMIN C) 1000 MG tablet, Take by mouth, Disp: , Rfl:   •  Cholecalciferol 30522 units CAPS, Take by mouth, Disp: , Rfl:   •  ciprofloxacin (CIPRO) 500 mg tablet, Take 1 tablet (500 mg total) by mouth every 12 (twelve) hours for 7 days, Disp: 14 tablet, Rfl: 0  •  clotrimazole-betamethasone (LOTRISONE) 6-1 09 % cream, 1 application  , Disp: , Rfl:   •  fluconazole (DIFLUCAN) 200 mg tablet, 200 mg, Disp: , Rfl:   •  gabapentin (NEURONTIN) 300 mg capsule, Take 300 mg by mouth daily, Disp: , Rfl:   •  glimepiride (AMARYL) 2 mg tablet, Take 2 mg by mouth daily, Disp: , Rfl:   •  Januvia 100 MG tablet, Take 100 mg by mouth daily, Disp: , Rfl:   •  lisinopril-hydrochlorothiazide (PRINZIDE,ZESTORETIC) 20-12 5 MG per tablet, Take 1 tablet by mouth every 12 (twelve) hours, Disp: , Rfl: 5  •  loperamide (IMODIUM A-D) 2 MG tablet, Take by mouth, Disp: , Rfl:   •  methenamine hippurate (HIPREX) 1 g tablet, Take 1 g by mouth 2 (two) times a day, Disp: , Rfl:   •  metoprolol succinate (TOPROL-XL) 200 MG 24 hr tablet, Take 200 mg by mouth 2 (two) times a day, Disp: , Rfl:   •  nortriptyline (PAMELOR) 25 mg capsule, Take 25 mg by mouth, Disp: , Rfl:   •  Omega-3 Fatty Acids (FISH OIL) 1,000 mg, Take by mouth, Disp: , Rfl:   •  omeprazole (PriLOSEC) 40 MG capsule, Take 40 mg by mouth 2 (two) times a day, Disp: , Rfl: 5  •  ondansetron (ZOFRAN) 4 mg tablet, 4 mg, Disp: , Rfl:   •  Probiotic Product (ACIDOPHILUS PROBIOTIC BLEND) CAPS, Take by mouth, Disp: , Rfl:   •  rosuvastatin (CRESTOR) 5 mg tablet, TAKE ONE TABLET BY MOUTH ON MONDAYS, WEDNESDAY AND FRIDAYS, Disp: , Rfl: 0  •  TRADJENTA 5 MG TABS, Take 5 mg by mouth daily, Disp: , Rfl: 5  •  ciprofloxacin (CIPRO) 500 mg tablet, Take 500 mg by mouth 2 (two) times a day (Patient not taking: Reported on 4/23/2023), Disp: , Rfl:   •  colestipol (COLESTID) 1 g tablet, Take by mouth (Patient not taking: Reported on 4/23/2023), Disp: , Rfl:   •  conjugated estrogens (PREMARIN) vaginal cream, Apply 0 5 grams to vagina 3 x per week (Patient not taking: Reported on 4/23/2023), Disp: , Rfl:   •  dicyclomine (BENTYL) 20 mg tablet, TAKE (1) TABLET EVERY SIX HOURS   (Patient not taking: Reported on 4/23/2023), Disp: , Rfl: 5  •  ondansetron (ZOFRAN) 8 mg tablet, DISSOLVE 1 TABLET IN MOUTH EVERY 8 HOURS AS NEEDED , Disp: , Rfl: 5  •  ondansetron (ZOFRAN-ODT) 4 mg disintegrating tablet, Take 1 tablet (4 mg total) by mouth every 6 (six) hours as needed for vomiting, Disp: 20 tablet, Rfl: 0  •  oxyCODONE-acetaminophen (PERCOCET) 5-325 mg per tablet, Take by mouth (Patient not taking: Reported on 4/23/2023), Disp: , Rfl:   •  prochlorperazine (COMPAZINE) 5 mg tablet, TAKE ONE TABLET BY MOUTH 4 TIMES A DAY AS NEEDED FOR NAUSEA (Patient not taking: Reported on 4/23/2023), Disp: , Rfl: 3  •  traMADol (ULTRAM) 50 mg tablet, TAKE ONE TABLET BY MOUTH 4 TIMES A DAY AS NEEDED (Patient not taking: Reported on 4/23/2023), Disp: , Rfl: 3  •  TRULICITY 6 24 UO/5 1CH SOPN, INJECT 0 75MG/0 5ML (1 SYRINGE) SUBCUTANEOUSLY WEEKLY (Patient not taking: Reported on 4/23/2023), Disp: , Rfl: 5  •  vedolizumab (ENTYVIO) 300 MG SOLR, Infuse into a venous catheter (Patient not taking: Reported on 4/23/2023), Disp: , Rfl:     Current Allergies     Allergies as of 04/23/2023 - Reviewed 04/23/2023   Allergen Reaction Noted   • Infliximab Myalgia 04/25/2013   • Other Hives 04/25/2013   • Penicillins Hives 04/25/2013   • Sulfa antibiotics Hives 04/25/2013   • Atorvastatin Myalgia 10/27/2018            The following portions of the patient's history were reviewed and updated as appropriate: allergies, current medications, past family history, past medical history, past social history, past surgical history and problem list      Past Medical History:   Diagnosis Date   • Crohn disease ([facility] [address] )    • [facility]    • GERD (gastroesophageal reflux disease)    • Hypertension        Past Surgical History:   Procedure Laterality Date   • APPENDECTOMY     • COLON SURGERY         Family History   Problem Relation Age of Onset   • Heart disease Mother          Medications have been verified  Objective   /72   Pulse 80   Temp 97 5 °F (36 4 °C)   Resp 18   SpO2 96%        Physical Exam     Physical Exam  Constitutional:       General: She is not in acute distress  Appearance: Normal appearance  She is not ill-appearing  HENT:      Head: Normocephalic and atraumatic  Eyes:      Extraocular Movements: Extraocular movements intact     Cardiovascular:      Rate and Rhythm: Normal rate and regular rhythm  Pulses: Normal pulses  Heart sounds: Normal heart sounds  No murmur heard  No friction rub  No gallop  Pulmonary:      Effort: Pulmonary effort is normal  No respiratory distress  Breath sounds: Normal breath sounds  No stridor  No wheezing, rhonchi or rales  Abdominal:      General: Abdomen is flat  A surgical scar is present  Bowel sounds are normal  There are no signs of injury  Palpations: Abdomen is soft  There is no mass  Tenderness: There is abdominal tenderness (Across the lower abdomen bilaterally  No overlying erythema, swelling, wound  )  There is no right CVA tenderness, left CVA tenderness, guarding or rebound  Skin:     General: Skin is warm and dry  Capillary Refill: Capillary refill takes less than 2 seconds  Neurological:      General: No focal deficit present  Mental Status: She is alert and oriented to person, place, and time  Mental status is at baseline     Psychiatric:         Mood and Affect: Mood normal          Behavior: Behavior normal

## 2023-04-26 LAB
BACTERIA UR CULT: ABNORMAL
BACTERIA UR CULT: ABNORMAL

## 2023-07-06 ENCOUNTER — OFFICE VISIT (OUTPATIENT)
Dept: URGENT CARE | Facility: CLINIC | Age: 63
End: 2023-07-06
Payer: MEDICARE

## 2023-07-06 VITALS
OXYGEN SATURATION: 98 % | BODY MASS INDEX: 36.68 KG/M2 | HEIGHT: 63 IN | TEMPERATURE: 97.9 F | WEIGHT: 207 LBS | HEART RATE: 70 BPM | DIASTOLIC BLOOD PRESSURE: 71 MMHG | RESPIRATION RATE: 18 BRPM | SYSTOLIC BLOOD PRESSURE: 142 MMHG

## 2023-07-06 DIAGNOSIS — N61.1 ABSCESS OF LEFT BREAST: Primary | ICD-10-CM

## 2023-07-06 DIAGNOSIS — Z87.2 HISTORY OF HIDRADENITIS SUPPURATIVA: ICD-10-CM

## 2023-07-06 PROBLEM — I44.0 HEART BLOCK AV FIRST DEGREE: Status: ACTIVE | Noted: 2022-04-07

## 2023-07-06 PROBLEM — I73.9 PVD (PERIPHERAL VASCULAR DISEASE) (HCC): Status: ACTIVE | Noted: 2019-09-10

## 2023-07-06 PROBLEM — F17.200 TOBACCO DEPENDENCE: Status: ACTIVE | Noted: 2020-06-05

## 2023-07-06 PROBLEM — Z87.898 HISTORY OF LOSS OF CONSCIOUSNESS: Status: ACTIVE | Noted: 2019-05-09

## 2023-07-06 PROBLEM — R93.7 ABNORMAL FINDINGS ON DIAGNOSTIC IMAGING OF OTHER PARTS OF MUSCULOSKELETAL SYSTEM: Status: ACTIVE | Noted: 2019-05-09

## 2023-07-06 PROBLEM — E78.2 MIXED HYPERLIPIDEMIA: Status: ACTIVE | Noted: 2022-03-05

## 2023-07-06 PROBLEM — S06.9XAA TRAUMATIC BRAIN INJURY (HCC): Status: ACTIVE | Noted: 2019-02-25

## 2023-07-06 PROBLEM — I10 HYPERTENSION, ESSENTIAL: Status: ACTIVE | Noted: 2020-06-03

## 2023-07-06 PROBLEM — M54.16 LUMBAR BACK PAIN WITH RADICULOPATHY AFFECTING RIGHT LOWER EXTREMITY: Status: ACTIVE | Noted: 2021-08-10

## 2023-07-06 PROBLEM — R20.2 FACIAL PARESTHESIA: Status: ACTIVE | Noted: 2019-05-06

## 2023-07-06 PROBLEM — M86.172 OSTEOMYELITIS OF ANKLE OR FOOT, LEFT, ACUTE (HCC): Status: ACTIVE | Noted: 2020-12-23

## 2023-07-06 PROBLEM — G43.009 MIGRAINE WITHOUT AURA AND WITHOUT STATUS MIGRAINOSUS, NOT INTRACTABLE: Status: ACTIVE | Noted: 2019-02-25

## 2023-07-06 PROCEDURE — G0463 HOSPITAL OUTPT CLINIC VISIT: HCPCS

## 2023-07-06 PROCEDURE — 99213 OFFICE O/P EST LOW 20 MIN: CPT

## 2023-07-06 RX ORDER — CLINDAMYCIN HYDROCHLORIDE 150 MG/1
450 CAPSULE ORAL 3 TIMES DAILY
Qty: 63 CAPSULE | Refills: 0 | Status: SHIPPED | OUTPATIENT
Start: 2023-07-06 | End: 2023-07-13

## 2023-07-06 RX ORDER — FERROUS SULFATE 325(65) MG
325 TABLET ORAL
COMMUNITY

## 2023-07-06 RX ORDER — USTEKINUMAB 90 MG/ML
INJECTION, SOLUTION SUBCUTANEOUS
COMMUNITY
Start: 2023-06-29

## 2023-07-06 RX ORDER — METOPROLOL SUCCINATE 200 MG/1
200 TABLET, EXTENDED RELEASE ORAL 2 TIMES DAILY
COMMUNITY

## 2023-07-06 RX ORDER — ERGOCALCIFEROL 1.25 MG/1
1 CAPSULE ORAL WEEKLY
COMMUNITY
Start: 2023-06-14

## 2023-07-06 RX ORDER — GLIMEPIRIDE 2 MG/1
2 TABLET ORAL
COMMUNITY

## 2023-07-06 NOTE — PATIENT INSTRUCTIONS
Take antibiotics as prescribed. Take entire course of antibiotics. Eat yogurt with live and active cultures and/or take a probiotic at least 3 hours before or after antibiotic dose. Monitor stool for diarrhea and/or blood. If this occurs, contact primary care doctor ASAP. Good hand hygiene  Avoid scratching area  Keep area clean and dry  May apply triple antibiotic ointment such as neosporin to area    Watch for signs of increased infection such as increased redness, swelling, streaking, change in temperature to area (warm to touch) or you develop a fever. If rash is spreading, facial swelling, shortness of breath, difficulty breathing, fever, any new or concerning symptoms please return or proceed ER. Follow up with PCP in 3-5 days.

## 2023-07-06 NOTE — PROGRESS NOTES
[facility]        NAME: Josef Curtis is a 60 y.o. female  : 1960    MRN: [id number]  DATE: 2023  TIME: 8:11 PM    Assessment and Plan   Abscess of left breast [N61.1]  1. Abscess of left breast  Ambulatory Referral to General Surgery    clindamycin (CLEOCIN) 150 mg capsule      2. History of hidradenitis suppurativa  Ambulatory Referral to General Surgery        Referral given for General Surgery    Patient Instructions     Take antibiotics as prescribed. Take entire course of antibiotics. Eat yogurt with live and active cultures and/or take a probiotic at least 3 hours before or after antibiotic dose. Monitor stool for diarrhea and/or blood. If this occurs, contact primary care doctor ASAP. Good hand hygiene  Avoid scratching area  Keep area clean and dry  May apply triple antibiotic ointment such as neosporin to area    Watch for signs of increased infection such as increased redness, swelling, streaking, change in temperature to area (warm to touch) or you develop a fever. If rash is spreading, facial swelling, shortness of breath, difficulty breathing, fever, any new or concerning symptoms please return or proceed ER. Follow up with PCP in 3-5 days. Chief Complaint     Chief Complaint   Patient presents with   • Wound Infection   • Breast Pain     Left breast opened wound draining with puss abscess in her sweat glandes common for her to get them. And she was on a antibiotic went away and came back with the drainage and opened up. Came back three days ago. Primary doctor is on vacation        History of Present Illness       Pt is a 61 y/o F who presents to the clinic with a CC of L breast pain. Pt has a history of hidradenitis suppurativa. Pt states she was treated with abx for this abscess in the past and it went away. It has since come back, opened up and had pus like drainage approximately 3 days ago.  Pt attempted to get in with her PCP but her PCP is on vacation so she decided to come to [facility] to be evaluated. Pt denies any fever, aches or chills. Review of Systems   Review of Systems   Constitutional: Negative for chills, diaphoresis and fever. Respiratory: Negative. Cardiovascular: Negative. Skin: Positive for wound. Current Medications       Current Outpatient Medications:   •  acetaminophen (TYLENOL) 325 mg tablet, Take by mouth, Disp: , Rfl:   •  amLODIPine (NORVASC) 5 mg tablet, Take 5 mg by mouth daily, Disp: , Rfl:   •  amLODIPine (NORVASC) 5 mg tablet, Take 5 mg by mouth daily, Disp: , Rfl:   •  ammonium lactate (LAC-HYDRIN) 12 % cream, Apply 1 application.  topically, Disp: , Rfl:   •  Ascorbic Acid (VITAMIN C) 1000 MG tablet, Take by mouth, Disp: , Rfl:   •  Cholecalciferol 58477 units CAPS, Take by mouth, Disp: , Rfl:   •  dicyclomine (BENTYL) 20 mg tablet, , Disp: , Rfl: 5  •  ferrous sulfate 325 (65 Fe) mg tablet, Take 325 mg by mouth, Disp: , Rfl:   •  fluconazole (DIFLUCAN) 200 mg tablet, 200 mg, Disp: , Rfl:   •  gabapentin (NEURONTIN) 300 mg capsule, Take 300 mg by mouth daily, Disp: , Rfl:   •  glimepiride (AMARYL) 2 mg tablet, Take 2 mg by mouth daily, Disp: , Rfl:   •  glimepiride (AMARYL) 2 mg tablet, Take 2 mg by mouth, Disp: , Rfl:   •  Januvia 100 MG tablet, Take 100 mg by mouth daily, Disp: , Rfl:   •  loperamide (IMODIUM A-D) 2 MG tablet, Take by mouth, Disp: , Rfl:   •  methenamine hippurate (HIPREX) 1 g tablet, Take 1 g by mouth 2 (two) times a day, Disp: , Rfl:   •  metoprolol succinate (TOPROL-XL) 200 MG 24 hr tablet, Take 200 mg by mouth 2 (two) times a day, Disp: , Rfl:   •  metoprolol succinate (TOPROL-XL) 200 MG 24 hr tablet, Take 200 mg by mouth 2 (two) times a day, Disp: , Rfl:   •  Omega-3 Fatty Acids (FISH OIL) 1,000 mg, Take by mouth, Disp: , Rfl:   •  omeprazole (PriLOSEC) 40 MG capsule, Take 40 mg by mouth 2 (two) times a day, Disp: , Rfl: 5  •  ondansetron (ZOFRAN) 8 mg tablet, DISSOLVE 1 TABLET IN MOUTH EVERY 8 HOURS AS NEEDED., Disp: , Rfl: 5  •  Probiotic Product (ACIDOPHILUS PROBIOTIC BLEND) CAPS, Take by mouth, Disp: , Rfl:   •  rosuvastatin (CRESTOR) 5 mg tablet, TAKE ONE TABLET BY MOUTH ON MONDAYS, WEDNESDAY AND FRIDAYS, Disp: , Rfl: 0  •  sitaGLIPtin (Januvia) 100 mg tablet, Take by mouth daily, Disp: , Rfl:   •  Stelara 90 MG/ML subcutaneous injection, INJECT 1 SYRINGE UNDER THE SKIN EVERY 8 WEEKS, Disp: , Rfl:   •  traMADol (ULTRAM) 50 mg tablet, , Disp: , Rfl: 3  •  Vitamin D, Ergocalciferol, 62929 units CAPS, Take 1 capsule by mouth once a week, Disp: , Rfl:     Current Allergies     Allergies as of 07/06/2023 - Reviewed 07/06/2023   Allergen Reaction Noted   • Infliximab Myalgia 04/25/2013   • Other Hives 04/25/2013   • Penicillins Hives 04/25/2013   • Sulfa antibiotics Hives 04/25/2013   • Atorvastatin Myalgia 10/27/2018            The following portions of the patient's history were reviewed and updated as appropriate: allergies, current medications, past family history, past medical history, past social history, past surgical history and problem list.     Past Medical History:   Diagnosis Date   • Crohn disease ([address] )    • Diabetes mellitus ([address])    • GERD (gastroesophageal reflux disease)    • Hypertension        Past Surgical History:   Procedure Laterality Date   • APPENDECTOMY     • COLON SURGERY         Family History   Problem Relation Age of Onset   • Heart disease Mother          Medications have been verified. Objective   /71   Pulse 70   Temp 97.9 °F (36.6 °C)   Resp 18   Ht 5' 3" (1.6 m)   Wt 93.9 kg (207 lb)   SpO2 98%   BMI 36.67 kg/m²        Physical Exam     Physical Exam  Vitals and nursing note reviewed. Constitutional:       General: She is not in acute distress. Appearance: Normal appearance. She is not ill-appearing or toxic-appearing. Cardiovascular:      Rate and Rhythm: Regular rhythm. Heart sounds: No murmur heard.   Pulmonary:      Breath sounds: Normal breath sounds. No stridor. No wheezing, rhonchi or rales. Skin:     General: Skin is warm. Findings: Abscess, erythema and rash present. Neurological:      Mental Status: She is alert.

## 2023-11-09 ENCOUNTER — APPOINTMENT (OUTPATIENT)
Dept: LAB | Facility: CLINIC | Age: 63
End: 2023-11-09
Payer: COMMERCIAL

## 2023-11-09 DIAGNOSIS — D51.0 PERNICIOUS ANEMIA: ICD-10-CM

## 2023-11-09 DIAGNOSIS — K21.9 GASTROESOPHAGEAL REFLUX DISEASE, UNSPECIFIED WHETHER ESOPHAGITIS PRESENT: ICD-10-CM

## 2023-11-09 DIAGNOSIS — I10 ESSENTIAL HYPERTENSION, MALIGNANT: ICD-10-CM

## 2023-11-09 DIAGNOSIS — R10.13 ABDOMINAL PAIN, EPIGASTRIC: ICD-10-CM

## 2023-11-09 DIAGNOSIS — I51.9 MYXEDEMA HEART DISEASE: ICD-10-CM

## 2023-11-09 DIAGNOSIS — E11.9 DIABETES MELLITUS WITHOUT COMPLICATION (HCC): ICD-10-CM

## 2023-11-09 DIAGNOSIS — E78.5 HYPERLIPIDEMIA, UNSPECIFIED HYPERLIPIDEMIA TYPE: ICD-10-CM

## 2023-11-09 DIAGNOSIS — D51.9 ANEMIA DUE TO VITAMIN B12 DEFICIENCY, UNSPECIFIED B12 DEFICIENCY TYPE: ICD-10-CM

## 2023-11-09 DIAGNOSIS — D50.9 IRON DEFICIENCY ANEMIA, UNSPECIFIED IRON DEFICIENCY ANEMIA TYPE: ICD-10-CM

## 2023-11-09 DIAGNOSIS — E03.9 MYXEDEMA HEART DISEASE: ICD-10-CM

## 2023-11-09 DIAGNOSIS — R53.83 OTHER FATIGUE: ICD-10-CM

## 2023-11-09 DIAGNOSIS — M79.10 MYALGIA: ICD-10-CM

## 2023-11-09 DIAGNOSIS — M25.50 PAIN IN JOINT, MULTIPLE SITES: ICD-10-CM

## 2023-11-09 DIAGNOSIS — R10.12 ABDOMINAL PAIN, LEFT UPPER QUADRANT: ICD-10-CM

## 2023-11-09 DIAGNOSIS — E55.9 AVITAMINOSIS D: ICD-10-CM

## 2023-11-09 LAB
ALBUMIN SERPL BCP-MCNC: 3.8 G/DL (ref 3.5–5)
ALP SERPL-CCNC: 105 U/L (ref 34–104)
ALT SERPL W P-5'-P-CCNC: 11 U/L (ref 7–52)
ANION GAP SERPL CALCULATED.3IONS-SCNC: 10 MMOL/L
AST SERPL W P-5'-P-CCNC: 9 U/L (ref 13–39)
BILIRUB SERPL-MCNC: 0.25 MG/DL (ref 0.2–1)
BUN SERPL-MCNC: 18 MG/DL (ref 5–25)
CALCIUM SERPL-MCNC: 9.4 MG/DL (ref 8.4–10.2)
CHLORIDE SERPL-SCNC: 105 MMOL/L (ref 96–108)
CHOLEST SERPL-MCNC: 203 MG/DL
CO2 SERPL-SCNC: 24 MMOL/L (ref 21–32)
CREAT SERPL-MCNC: 0.91 MG/DL (ref 0.6–1.3)
CRP SERPL HS-MCNC: 9.5 MG/L
CRP SERPL QL: 9.9 MG/L
ERYTHROCYTE [SEDIMENTATION RATE] IN BLOOD: 64 MM/HOUR (ref 0–29)
EST. AVERAGE GLUCOSE BLD GHB EST-MCNC: 163 MG/DL
GFR SERPL CREATININE-BSD FRML MDRD: 67 ML/MIN/1.73SQ M
GLUCOSE P FAST SERPL-MCNC: 159 MG/DL (ref 65–99)
HBA1C MFR BLD: 7.3 %
HDLC SERPL-MCNC: 40 MG/DL
NONHDLC SERPL-MCNC: 163 MG/DL
POTASSIUM SERPL-SCNC: 4 MMOL/L (ref 3.5–5.3)
PROT SERPL-MCNC: 7.3 G/DL (ref 6.4–8.4)
SODIUM SERPL-SCNC: 139 MMOL/L (ref 135–147)
TRIGL SERPL-MCNC: 446 MG/DL

## 2023-11-09 PROCEDURE — 83036 HEMOGLOBIN GLYCOSYLATED A1C: CPT

## 2023-11-09 PROCEDURE — 86140 C-REACTIVE PROTEIN: CPT

## 2023-11-09 PROCEDURE — 85652 RBC SED RATE AUTOMATED: CPT

## 2023-11-09 PROCEDURE — 80053 COMPREHEN METABOLIC PANEL: CPT

## 2023-11-09 PROCEDURE — 80061 LIPID PANEL: CPT

## 2023-11-09 PROCEDURE — 36415 COLL VENOUS BLD VENIPUNCTURE: CPT

## 2023-11-09 PROCEDURE — 86141 C-REACTIVE PROTEIN HS: CPT
